# Patient Record
Sex: FEMALE | Race: WHITE
[De-identification: names, ages, dates, MRNs, and addresses within clinical notes are randomized per-mention and may not be internally consistent; named-entity substitution may affect disease eponyms.]

---

## 2017-08-18 NOTE — RADIOLOGY REPORT (SQ)
EXAM DESCRIPTION:  U/S OB 14+ TRNABD 1GES W/O DOP



COMPLETED DATE/TIME:  8/18/2017 1:38 pm



REASON FOR STUDY:  ENC FOR SUPERVISION OF OTHER NORMAL PREGNANCY, 2ND TRIMESTER (Z34.82) Z34.82  ENCO
UNTER FOR SUPRVSN OF NORMAL PREGNANCY, SECOND TRI



COMPARISON:  None.



TECHNIQUE:  Static and Dynamic grayscale imaging performed of gravid uterus using transabdominal appr
oach.  Additional selected color Doppler and spectral images recorded.  All stored on PACS.



LIMITATIONS:  None.



FINDINGS:  EGA: 15 weeks 4 days

SOILA: 2/5/2018

EFW: 129 +/- 19 g

PERCENTILE: Not calculated

HOANG: Largest pocket 3.9 cm

PLACENTA: Posterior

FETAL PRESENTATION: Breech

FETAL ANATOMY:

FETAL HEART RATE: 140 beats per minute.

FOUR CHAMBER HEART: Visualized.

THREE VESSEL CORD: Yes.

CORD INSERTION: Visualized.

KIDNEYS AND BLADDER: Visualized. Appear normal.

STOMACH: Visualized. Appears normal.

SPINE: Normal as visualized.

BRAIN AND LATERAL VENTRICLES: Visualized. Appear normal.

OTHER: No other significant finding.

MATERNAL ADNEXA: Maternal ovaries not visualized.

CERVICAL LENGTH: 3.1 cm   Closed.

OTHER: No other significant finding.



IMPRESSION:  LIVING INTRAUTERINE PREGNANCY.

ESTIMATED GESTATIONAL AGE 15 weeks 4 days

NO VISUALIZED ANOMALIES.

Trimester of pregnancy: Second trimester - 13 weeks 1 day to 27 weeks 6 days.



TECHNICAL DOCUMENTATION:  JOB ID:  [id number]

 2011 [employer]- All Rights Reserved

## 2018-01-26 LAB
ADD MANUAL DIFF: NO
APPEARANCE UR: (no result)
APTT PPP: YELLOW S
BARBITURATES UR QL SCN: NEGATIVE
BASOPHILS # BLD AUTO: 0 10^3/UL (ref 0–0.2)
BASOPHILS NFR BLD AUTO: 0.2 % (ref 0–2)
BILIRUB UR QL STRIP: NEGATIVE
CHLAM PCR: NOT DETECTED
EOSINOPHIL # BLD AUTO: 0.2 10^3/UL (ref 0–0.6)
EOSINOPHIL NFR BLD AUTO: 1.3 % (ref 0–6)
ERYTHROCYTE [DISTWIDTH] IN BLOOD BY AUTOMATED COUNT: 15.5 % (ref 11.5–14)
GLUCOSE UR STRIP-MCNC: NEGATIVE MG/DL
GON PCR: NOT DETECTED
HCT VFR BLD CALC: 36.3 % (ref 36–47)
HGB BLD-MCNC: 12.1 G/DL (ref 12–15.5)
KETONES UR STRIP-MCNC: NEGATIVE MG/DL
LYMPHOCYTES # BLD AUTO: 3.3 10^3/UL (ref 0.5–4.7)
LYMPHOCYTES NFR BLD AUTO: 25.3 % (ref 13–45)
MCH RBC QN AUTO: 28.1 PG (ref 27–33.4)
MCHC RBC AUTO-ENTMCNC: 33.4 G/DL (ref 32–36)
MCV RBC AUTO: 84 FL (ref 80–97)
METHADONE UR QL SCN: NEGATIVE
MONOCYTES # BLD AUTO: 1 10^3/UL (ref 0.1–1.4)
MONOCYTES NFR BLD AUTO: 7.4 % (ref 3–13)
NEUTROPHILS # BLD AUTO: 8.6 10^3/UL (ref 1.7–8.2)
NEUTS SEG NFR BLD AUTO: 65.8 % (ref 42–78)
NITRITE UR QL STRIP: NEGATIVE
PCP UR QL SCN: NEGATIVE
PH UR STRIP: 6 [PH] (ref 5–9)
PLATELET # BLD: 366 10^3/UL (ref 150–450)
PROT UR STRIP-MCNC: 30 MG/DL
RBC # BLD AUTO: 4.31 10^6/UL (ref 3.72–5.28)
SP GR UR STRIP: 1.02
TOTAL CELLS COUNTED % (AUTO): 100 %
URINE AMPHETAMINES SCREEN: NEGATIVE
URINE BENZODIAZEPINES SCREEN: NEGATIVE
URINE COCAINE SCREEN: NEGATIVE
URINE MARIJUANA (THC) SCREEN: (no result)
UROBILINOGEN UR-MCNC: NEGATIVE MG/DL (ref ?–2)
WBC # BLD AUTO: 13 10^3/UL (ref 4–10.5)

## 2018-01-29 ENCOUNTER — HOSPITAL ENCOUNTER (INPATIENT)
Dept: HOSPITAL 62 - 2S | Age: 23
LOS: 2 days | Discharge: HOME | End: 2018-01-31
Attending: OBSTETRICS & GYNECOLOGY | Admitting: OBSTETRICS & GYNECOLOGY
Payer: MEDICAID

## 2018-01-29 DIAGNOSIS — O34.211: Primary | ICD-10-CM

## 2018-01-29 DIAGNOSIS — D64.9: ICD-10-CM

## 2018-01-29 DIAGNOSIS — F41.9: ICD-10-CM

## 2018-01-29 DIAGNOSIS — Z30.2: ICD-10-CM

## 2018-01-29 DIAGNOSIS — Z3A.39: ICD-10-CM

## 2018-01-29 PROCEDURE — 36415 COLL VENOUS BLD VENIPUNCTURE: CPT

## 2018-01-29 PROCEDURE — 85025 COMPLETE CBC W/AUTO DIFF WBC: CPT

## 2018-01-29 PROCEDURE — 0UB70ZZ EXCISION OF BILATERAL FALLOPIAN TUBES, OPEN APPROACH: ICD-10-PCS | Performed by: OBSTETRICS & GYNECOLOGY

## 2018-01-29 PROCEDURE — 4A1HXCZ MONITORING OF PRODUCTS OF CONCEPTION, CARDIAC RATE, EXTERNAL APPROACH: ICD-10-PCS | Performed by: OBSTETRICS & GYNECOLOGY

## 2018-01-29 PROCEDURE — 59025 FETAL NON-STRESS TEST: CPT

## 2018-01-29 PROCEDURE — 86900 BLOOD TYPING SEROLOGIC ABO: CPT

## 2018-01-29 PROCEDURE — 87591 N.GONORRHOEAE DNA AMP PROB: CPT

## 2018-01-29 PROCEDURE — 94799 UNLISTED PULMONARY SVC/PX: CPT

## 2018-01-29 PROCEDURE — 85027 COMPLETE CBC AUTOMATED: CPT

## 2018-01-29 PROCEDURE — 80307 DRUG TEST PRSMV CHEM ANLYZR: CPT

## 2018-01-29 PROCEDURE — 81001 URINALYSIS AUTO W/SCOPE: CPT

## 2018-01-29 PROCEDURE — 86850 RBC ANTIBODY SCREEN: CPT

## 2018-01-29 PROCEDURE — 87491 CHLMYD TRACH DNA AMP PROBE: CPT

## 2018-01-29 PROCEDURE — 86901 BLOOD TYPING SEROLOGIC RH(D): CPT

## 2018-01-29 PROCEDURE — 88302 TISSUE EXAM BY PATHOLOGIST: CPT

## 2018-01-29 RX ADMIN — KETOROLAC TROMETHAMINE SCH MG: 30 INJECTION, SOLUTION INTRAMUSCULAR at 13:18

## 2018-01-29 RX ADMIN — OXYCODONE AND ACETAMINOPHEN PRN TAB: 5; 325 TABLET ORAL at 12:28

## 2018-01-29 RX ADMIN — MORPHINE SULFATE PRN MG: 10 INJECTION INTRAMUSCULAR; INTRAVENOUS; SUBCUTANEOUS at 17:17

## 2018-01-29 RX ADMIN — Medication SCH CAP: at 11:22

## 2018-01-29 RX ADMIN — PROMETHAZINE HYDROCHLORIDE PRN MG: 25 INJECTION INTRAMUSCULAR; INTRAVENOUS at 22:24

## 2018-01-29 RX ADMIN — OXYCODONE AND ACETAMINOPHEN PRN TAB: 5; 325 TABLET ORAL at 19:51

## 2018-01-29 RX ADMIN — MORPHINE SULFATE PRN MG: 10 INJECTION INTRAMUSCULAR; INTRAVENOUS; SUBCUTANEOUS at 11:29

## 2018-01-29 RX ADMIN — KETOROLAC TROMETHAMINE SCH MG: 30 INJECTION, SOLUTION INTRAMUSCULAR at 22:21

## 2018-01-29 RX ADMIN — DOCUSATE SODIUM SCH MG: 100 CAPSULE, LIQUID FILLED ORAL at 17:17

## 2018-01-29 RX ADMIN — DOCUSATE SODIUM SCH MG: 100 CAPSULE, LIQUID FILLED ORAL at 11:22

## 2018-01-29 NOTE — OPERATIVE REPORT E
Operative Report



NAME: KONSTANTIN FLOYD

MRN:  [id number]          : 1995 AGE:  22Y

DATE OF SURGERY: 2018               ROOM: 227



PREOPERATIVE DIAGNOSES:

1.  IUP AT 39-0/7 WEEKS.

2.  PREVIOUS .

3.  UNDESIRED FERTILITY.



POSTOPERATIVE DIAGNOSES:

1.  IUP AT 39-0/7 WEEKS.

2.  PREVIOUS .

3.  UNDESIRED FERTILITY.



OPERATION:

A low transverse hysterotomy  section with [place] tubal

ligation.



SURGEON:

ALLYSON BURLESON M.D.



ANESTHESIA:

Dr. Blevins with a spinal.



FINDINGS:

Female infant in cephalic presentation with Apgars of 9, 9; weight 7

pounds 8 ounces.



COMPLICATIONS:

None.



ESTIMATED BLOOD LOSS:

600 mL.



TISSUE REMOVED OR ALTERED:

Bilateral fallopian tubes.



PROCEDURE:

Patient was taken to operating room, prepared and draped in normal sterile

fashion in the supine position with a leftward tilt.  A transverse skin

incision was made with a scalpel following the patient's previous scar. 

This was extended laterally with Russell's at the fascia, and the rectus

muscle was then divided sharply using Wells's and Bovie as needed.  The

rectus muscle was divided.  The peritoneal cavity was entered bluntly with

good visualization of the bladder and the uterus.  The bladder blade was

inserted, and then hysterotomy was nicked with a scalpel and extended

laterally with surgeon finger fracture.  The infant was then delivered

atraumatically.  The nose and mouth were suctioned with a suction bulb,

and the cord was clamped and cut and the infant was handed off to waiting

pediatricians.  Cord blood was collected, and the placenta was removed

manually.  The uterus was exteriorized and cleared of clots and debris. 

The hysterotomy was closed with 0 Monocryl in a running locked fashion.  A

second layer using the same suture was used to imbricate to ensure

hemostasis.  Attention was then turned to the fallopian tubes.  The right

fallopian tube was grasped with a Dickens, and the mesosalpinx was

divided.  A large section of this fallopian tube was then tied off with 2

pieces of 2-0 chromic.  The section tied off was at least 3.5 cm.  This

intermediate section was then removed with Metzenbaum's without

difficulty, and the pedicles were made hemostatic with a Bovie.  This was

repeated on the left fallopian tube, again without difficulty.  The uterus

was then returned to the abdomen, and the peritoneal cavity was cleared of

clots and debris.  The pedicles were reinspected and all found to be

hemostatic.  The hysterotomy was also hemostatic.  The rectus muscle and

peritoneum were reapproximated with a mattress stitch of 2-0 chromic.  The

fascia was closed with 0 Vicryl.  Subcutaneous layer was closed with plain

catgut, and the skin was closed with 4-0 Vicryl.  Patient tolerated

procedure well.  Sponge, lap and needle counts correct x2, and the patient

was taken to recovery in stable condition.





DICTATING PHYSICIAN:  ALLYSON BURLESON M.D.





[id number]                  DT: 2018

PHY#: [id number]            DD: 2018

ID:   [id number]           JOB#: [id number]       ACCT: [id number]



cc:ALLYSON BURLESON M.D.

>
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
## 2018-01-30 LAB
ERYTHROCYTE [DISTWIDTH] IN BLOOD BY AUTOMATED COUNT: 15.7 % (ref 11.5–14)
HCT VFR BLD CALC: 34 % (ref 36–47)
HGB BLD-MCNC: 11.2 G/DL (ref 12–15.5)
MCH RBC QN AUTO: 28.2 PG (ref 27–33.4)
MCHC RBC AUTO-ENTMCNC: 33.1 G/DL (ref 32–36)
MCV RBC AUTO: 85 FL (ref 80–97)
PLATELET # BLD: 278 10^3/UL (ref 150–450)
RBC # BLD AUTO: 3.99 10^6/UL (ref 3.72–5.28)
WBC # BLD AUTO: 13.5 10^3/UL (ref 4–10.5)

## 2018-01-30 RX ADMIN — IBUPROFEN SCH MG: 800 TABLET, FILM COATED ORAL at 23:59

## 2018-01-30 RX ADMIN — PROMETHAZINE HYDROCHLORIDE PRN MG: 25 TABLET ORAL at 12:49

## 2018-01-30 RX ADMIN — Medication SCH CAP: at 09:20

## 2018-01-30 RX ADMIN — PROMETHAZINE HYDROCHLORIDE PRN MG: 25 INJECTION INTRAMUSCULAR; INTRAVENOUS at 07:30

## 2018-01-30 RX ADMIN — DOCUSATE SODIUM SCH MG: 100 CAPSULE, LIQUID FILLED ORAL at 09:19

## 2018-01-30 RX ADMIN — OXYCODONE AND ACETAMINOPHEN PRN TAB: 5; 325 TABLET ORAL at 12:49

## 2018-01-30 RX ADMIN — OXYCODONE AND ACETAMINOPHEN PRN TAB: 5; 325 TABLET ORAL at 06:52

## 2018-01-30 RX ADMIN — KETOROLAC TROMETHAMINE SCH MG: 30 INJECTION, SOLUTION INTRAMUSCULAR at 05:03

## 2018-01-30 RX ADMIN — DOCUSATE SODIUM SCH MG: 100 CAPSULE, LIQUID FILLED ORAL at 17:43

## 2018-01-30 RX ADMIN — IBUPROFEN SCH MG: 800 TABLET, FILM COATED ORAL at 17:42

## 2018-01-30 RX ADMIN — OXYCODONE AND ACETAMINOPHEN PRN TAB: 5; 325 TABLET ORAL at 18:45

## 2018-01-30 RX ADMIN — IBUPROFEN SCH MG: 800 TABLET, FILM COATED ORAL at 11:07

## 2018-01-30 NOTE — PDOC PROGRESS REPORT
Subjective-OB


Subjective: 


Post Delivery Day:








22 year old.  Denies any needs at this time.  Pt doing well, c/o pain 3/5 and 

says she doesnt have any other pain meds due.  Ambulatory, +voiding well, light 

bleeding, and +flatus and regular diet.  








Physical Exam (OB)


Vital Signs: 


 











Temp Pulse Resp BP Pulse Ox


 


 98.4 F   92   18   138/76 H  96 


 


 18 08:22  18 08:22  18 08:22  18 08:22  18 08:22








 Intake & Output











 18





 06:59 06:59 06:59


 


Intake Total  2350 


 


Output Total  4600 


 


Balance  -2250 


 


Weight 86.183 kg  














- 


Dressing Removed: No - op site


Incision: Dressing





- Lochia


Lochia Amount: Small 10-25 ml


Lochia Color: Rubra/Red





- Abdomen


Description: Soft


Hernia Present: No


Fundal Description: Firm, Midline


Fundal Height: u/u - u/2





Objective-Diagnostic


Laboratory: 


 





 18 06:36 





 











  18





  06:36


 


WBC  13.5 H


 


RBC  3.99


 


Hgb  11.2 L


 


Hct  34.0 L


 


MCV  85


 


MCH  28.2


 


MCHC  33.1


 


RDW  15.7 H


 


Plt Count  278














Assessment and Plan(PN)





- Assessment and Plan


(1) Acute blood loss anemia


Is this a current diagnosis for this admission?: Yes   





(2)  delivery delivered


Is this a current diagnosis for this admission?: Yes   





- Time Spent with Patient


Time with patient: Less than 15 minutes


Smoking Education Provided: Over 3 minutes


Medications reviewed and adjusted accordingly: Yes





- Disposition


Anticipated Discharge: Home


Within: within 24 hours

## 2018-01-31 VITALS — SYSTOLIC BLOOD PRESSURE: 111 MMHG | DIASTOLIC BLOOD PRESSURE: 50 MMHG

## 2018-01-31 RX ADMIN — IBUPROFEN SCH MG: 800 TABLET, FILM COATED ORAL at 06:16

## 2018-01-31 RX ADMIN — OXYCODONE AND ACETAMINOPHEN PRN TAB: 5; 325 TABLET ORAL at 08:28

## 2018-01-31 RX ADMIN — PROMETHAZINE HYDROCHLORIDE PRN MG: 25 TABLET ORAL at 01:12

## 2018-01-31 RX ADMIN — IBUPROFEN SCH MG: 800 TABLET, FILM COATED ORAL at 11:56

## 2018-01-31 RX ADMIN — OXYCODONE AND ACETAMINOPHEN PRN TAB: 5; 325 TABLET ORAL at 01:12

## 2018-01-31 RX ADMIN — OXYCODONE AND ACETAMINOPHEN PRN TAB: 5; 325 TABLET ORAL at 14:02

## 2018-01-31 RX ADMIN — Medication SCH CAP: at 08:27

## 2018-01-31 RX ADMIN — DOCUSATE SODIUM SCH MG: 100 CAPSULE, LIQUID FILLED ORAL at 08:28

## 2018-01-31 RX ADMIN — PROMETHAZINE HYDROCHLORIDE PRN MG: 25 TABLET ORAL at 08:28

## 2018-01-31 NOTE — PDOC DISCHARGE SUMMARY
Final Diagnosis


Discharge Date: 18





- Final Diagnosis


(1) Acute blood loss anemia


Is this a current diagnosis for this admission?: Yes   





(2)  delivery delivered


Is this a current diagnosis for this admission?: Yes   





Discharge Data





- Discharge Medication


Prescriptions: 


Oxycodone HCl/Acetaminophen [Percocet 5-325 mg Tablet] 2 tab PO Q4HP PRN #30 

tablet


 PRN Reason: 


Docusate Sodium [Colace 100 mg Capsule] 100 mg PO BID #60 capsule


Ibuprofen [Motrin 800 mg Tablet] 800 mg PO Q6 #60 tablet


Home Medications: 








Prenatal Vits96/Iron Fum/Folic [Prenatal Tablet] 1 each PO DAILY #30 tablet 11/

08/15 


Calcium Carbonate [Tums] 2 tab PO QHS PRN 18 


Docusate Sodium [Colace 100 mg Capsule] 100 mg PO BID #60 capsule 18 


Ibuprofen [Motrin 800 mg Tablet] 800 mg PO Q6 #60 tablet 18 


Oxycodone HCl/Acetaminophen [Percocet 5-325 mg Tablet] 2 tab PO Q4HP PRN #30 

tablet 18 








Gestational Age: 39.0


Reason(s) for Admission: Ceasarean Section-Repeat


Prenatal Procedures: NST


Intrapartum Procedure(s): : Low Cervical, Transverse





- [place] Data


  ** Baby 1 Female


Apgar at 1 minute: 9


Apgar at 5 minutes: 9


Weight: 3.402 kg


Home with Mother: Yes


Complications: No





- Diagnosis Test


Laboratory: 


 











Temp Pulse Resp BP Pulse Ox


 


 98.1 F   88   17   111/50 L  99 


 


 18 08:28  18 08:28  18 08:28  18 08:28  18 08:28








 











  18





  10:10 10:17 06:36


 


RBC   4.31  3.99


 


Hgb   12.1  11.2 L


 


Hct   36.3  34.0 L


 


Urine Opiates Screen  NEGATIVE  














- Discharge information/Instructions


Discharge Activity: Activity As Tolerated, Pelvic Rest


Discharge Diet: Regular


Disposition: HOME, SELF-CARE


Follow up with: [facility]


in: 1, Weeks

## 2018-01-31 NOTE — PDOC PROGRESS REPORT
Subjective-OB


Subjective: 


Post Delivery Day: 2








22 year old.  States she is not ready for discharge, having a hard time getting 

up and down from bed, voiding without difficulty, lochia is stable, pain 

moderately controlled with meds, passing gas tolerating diet.  








Physical Exam (OB)


Vital Signs: 


 











Temp Pulse Resp BP Pulse Ox


 


 98.1 F   85   18   131/68 H  98 


 


 18 04:12  18 04:12  18 04:12  18 04:12  18 04:12








 Intake & Output











 18





 06:59 06:59 06:59


 


Intake Total 2350 1520 


 


Output Total 4600  


 


Balance -2250 1520 














- 


Dressing Removed: Yes


Incision: Well Approximated


Closure Type: Sutures





- Lochia


Lochia Amount: Scant < 10 ml


Lochia Color: Rubra/Red





- Abdomen


Description: Soft


Hernia Present: No


Fundal Description: Firm, Midline


Fundal Height: u/u - u/2





Objective-Diagnostic


Laboratory: 


 





 18 06:36 











Assessment and Plan(PN)





- Assessment and Plan


(1) Acute blood loss anemia


Is this a current diagnosis for this admission?: Yes   


Plan: 


ferrous sulfate


increase dietary iron








(2)  delivery delivered


Is this a current diagnosis for this admission?: Yes   


Plan: 


routine post op care 


d/c home tomorrow








- Time Spent with Patient


Time with patient: Less than 15 minutes


Critical Time spent with patient: Less than 15 minutes


Smoking Education Provided: Over 3 minutes


Medications reviewed and adjusted accordingly: Yes





- Disposition


Anticipated Discharge: Home


Within: within 24 hours